# Patient Record
(demographics unavailable — no encounter records)

---

## 2025-05-05 NOTE — PHYSICAL EXAM
[General Appearance - Well Nourished] : well nourished [General Appearance - Well Developed] : well developed [Sclera] : the sclera and conjunctiva were normal [Auscultation Breath Sounds / Voice Sounds] : lungs were clear to auscultation bilaterally [Heart Sounds] : normal S1 and S2 [Heart Sounds Gallop] : no gallops [Murmurs] : no murmurs [Abdomen Soft] : soft [Abdomen Tenderness] : non-tender [FreeTextEntry1] : L periauricular enlared lymph node, mobile, non tender  [Musculoskeletal - Swelling] : no joint swelling seen [] : no rash [Skin Lesions] : no skin lesions [Oriented To Time, Place, And Person] : oriented to person, place, and time

## 2025-05-05 NOTE — DATA REVIEWED
[FreeTextEntry1] : Labs 2019  AVELINO 1:640 speckled  SSA>8, SSB 1; ESR, CRP wnl  CCP, DsDNA, ALON, b2gp, cardiolipin, centromere, scl70 C3 78, C4 9

## 2025-05-05 NOTE — HISTORY OF PRESENT ILLNESS
[FreeTextEntry1] : 63 M here   Pt years following rheumatology for serologies but no clinical signs of rheumatologic dz.   No fevers, h/a, , hair loss, oral ulcers, epistaxis, sinusitis,  swollen glands, dry mouth,  CP, SOB, cough, vision changes, abdominal pain, GERD, n/v/d, blood in stool or urine, focal weakness, sensory loss,  Raynaud's, joint pain, swelling, weight loss.   Pt has rash on R ankle, that stops at where he wears his boot.  hx of MGUS, followed by hematology dry mouth for years now  mild SOB at night  L wrist pain only when driving, and R elbow when holding bag

## 2025-05-05 NOTE — ASSESSMENT
[FreeTextEntry1] : 62 y/o M w Sjogren's =dry mouth =labs 2019 w high AVELINO, SSA, RF =currently L periauricular enlarged lymph node  =MGUS-monitored by heme   Explained diagnosis and prognosis of Sjogren, including SICCA symptoms, dry eyes and dry skin. Explained the various pharmacologic options to treat SICCA symptoms in Sjogren. Explained that pt should have yearly visits to ophthalmologist and dentist, to aid in SICCA symptoms treatment and screen for corneal damage and dental carries respectively. Explained that in rare more severe cases of Sjogren, pt can develop organ involvement, like lung, kidney, but not common. Explained pt is higher risk of lymphoma, and should be screened regularly with me.   Will refer for US of neck, given L periauricular enlarged lymph node.   Plan -Labs w serologies, inflammatory markers RTO depending on above results